# Patient Record
Sex: MALE | Race: BLACK OR AFRICAN AMERICAN | Employment: FULL TIME | URBAN - METROPOLITAN AREA
[De-identification: names, ages, dates, MRNs, and addresses within clinical notes are randomized per-mention and may not be internally consistent; named-entity substitution may affect disease eponyms.]

---

## 2020-09-21 ENCOUNTER — TELEPHONE (OUTPATIENT)
Dept: FAMILY MEDICINE CLINIC | Age: 30
End: 2020-09-21

## 2020-09-21 ENCOUNTER — VIRTUAL VISIT (OUTPATIENT)
Dept: FAMILY MEDICINE CLINIC | Age: 30
End: 2020-09-21
Payer: MEDICAID

## 2020-09-21 DIAGNOSIS — Z13.6 SCREENING FOR CARDIOVASCULAR CONDITION: ICD-10-CM

## 2020-09-21 DIAGNOSIS — F32.A DEPRESSION, UNSPECIFIED DEPRESSION TYPE: Primary | ICD-10-CM

## 2020-09-21 DIAGNOSIS — Z13.1 SCREENING FOR DIABETES MELLITUS: ICD-10-CM

## 2020-09-21 DIAGNOSIS — R42 INTERMITTENT LIGHTHEADEDNESS: ICD-10-CM

## 2020-09-21 PROCEDURE — 99203 OFFICE O/P NEW LOW 30 MIN: CPT | Performed by: FAMILY MEDICINE

## 2020-09-21 NOTE — PROGRESS NOTES
Consent: Marino Greenwood, who was seen by synchronous (real-time) audio-video technology, and/or his healthcare decision maker, is aware that this patient-initiated, Telehealth encounter on 9/21/2020 is a billable service, with coverage as determined by his insurance carrier. He is aware that he may receive a bill and has provided verbal consent to proceed: Yes. Assessment & Plan:   Diagnoses and all orders for this visit:    1. Depression, unspecified depression type  -     REFERRAL TO PSYCHIATRY  -     CBC WITH AUTOMATED DIFF; Future    2. Screening for diabetes mellitus  -     HEMOGLOBIN A1C WITH EAG; Future  -     URINALYSIS W/ RFLX MICROSCOPIC; Future  -     METABOLIC PANEL, COMPREHENSIVE; Future    3. Screening for cardiovascular condition  -     LIPID PANEL; Future    4. Intermittent lightheadedness  -     HEMOGLOBIN A1C WITH EAG; Future  -     URINALYSIS W/ RFLX MICROSCOPIC; Future  -     CBC WITH AUTOMATED DIFF; Future  -     METABOLIC PANEL, COMPREHENSIVE; Future  -     THYROID CASCADE PROFILE; Future          Follow-up and Dispositions    · Return in about 2 weeks (around 10/5/2020) for follow up, [place], discuss labs. I ADVISED PATIENT TO GO TO ER IF SYMPTOMS WORSEN , CHANGE OR FAILS TO IMPROVE. I spent at least 15 minutes with this established patient, and >50% of the time was spent counseling and/or coordinating care regarding SEE BELOW  [id number]  Subjective:   Marino Greenwood is a 27 y.o. male who was seen for [address]      1. Depression, unspecified depression type  Patient reports his daughter passed away in 2018. After that he has been feeling sad. He started drinking more on weekends. 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 He has not tried any medication or counseling for this. He requests referral today. He denies suicidal or homicidal ideation. 2. Screening for diabetes mellitus  Has family history of diabetes.     3. Screening for cardiovascular condition  Check labs    4. Intermittent lightheadedness  Patient reports he had a few episodes of lightheadedness couple of weeks ago but this has resolved now. He is recently started to eat healthy and balanced diet. He requests lab work today       Prior to Admission medications    Not on File     No Known Allergies    There is no problem list on file for this patient. There are no active problems to display for this patient. No Known Allergies  No past medical history on file. No past surgical history on file. No family history on file. Social History     Tobacco Use    Smoking status: Not on file   Substance Use Topics    Alcohol use: Not on file       Review of Systems   Constitutional: Negative. HENT: Negative. Eyes: Negative. Respiratory: Negative. Cardiovascular: Negative. Gastrointestinal: Negative. Genitourinary: Negative. Musculoskeletal: Negative. Skin: Negative. Neurological: Negative. Endo/Heme/Allergies: Negative. Psychiatric/Behavioral: Positive for depression. Negative for suicidal ideas. Objective:     No flowsheet data found.      [INSTRUCTIONS:  \"[x]\" Indicates a positive item  \"[]\" Indicates a negative item  -- DELETE ALL ITEMS NOT EXAMINED]    Constitutional: [x] Appears well-developed and well-nourished [x] No apparent distress      [] Abnormal -     Mental status: [x] Alert and awake  [x] Oriented to person/place/time [x] Able to follow commands    [] Abnormal -     Eyes:   EOM    [x]  Normal    [] Abnormal -   Sclera  [x]  Normal    [] Abnormal -          Discharge [x]  None visible   [] Abnormal -     HENT: [x] Normocephalic, atraumatic  [] Abnormal -   [x] Mouth/Throat: Mucous membranes are moist    External Ears [x] Normal  [] Abnormal -    Neck: [x] No visualized mass [] Abnormal -     Pulmonary/Chest: [x] Respiratory effort normal   [x] No visualized signs of difficulty breathing or respiratory distress        [] Abnormal -      Musculoskeletal:   [x] Normal gait with no signs of ataxia         [x] Normal range of motion of neck        [] Abnormal -     Neurological:        [x] No Facial Asymmetry (Cranial nerve 7 motor function) (limited exam due to video visit)          [x] No gaze palsy        [] Abnormal -          Skin:        [x] No significant exanthematous lesions or discoloration noted on facial skin         [] Abnormal -            Psychiatric:       [x] Normal Affect [] Abnormal -        [x] No Hallucinations    Other pertinent observable physical exam findings:-    We discussed the expected course, resolution and complications of the diagnosis(es) in detail. Medication risks, benefits, costs, interactions, and alternatives were discussed as indicated. I advised him to contact the office if his condition worsens, changes or fails to improve as anticipated. He expressed understanding with the diagnosis(es) and plan. Jina Phalen is a 27 y.o. male being evaluated by a video visit encounter for concerns as above. A caregiver was present when appropriate. Due to this being a TeleHealth encounter ([facility] public health [facility]), evaluation of the following organ systems was limited: Vitals/Constitutional/EENT/Resp/CV/GI//MS/Neuro/Skin/Heme-Lymph-Imm. Pursuant to the emergency declaration under the [facility], 1135 waiver authority and the [employer] and [employer] Act, this Virtual  Visit was conducted, with patient's (and/or legal guardian's) consent, to reduce the patient's risk of exposure to COVID-19 and provide necessary medical care. Services were provided through a video synchronous discussion virtually to substitute for in-person clinic visit. Patient was located at his home. I was at office while conducting this encounter.        Freda Vicente MD

## 2020-09-25 NOTE — TELEPHONE ENCOUNTER
Called patient, verified id [id number]. Gave pt verbal message per Dr. Arik Johnson. Patient understood verbal message given. Patient states that he will call to make apt for labs.

## 2024-06-19 ENCOUNTER — HOSPITAL ENCOUNTER (EMERGENCY)
Facility: HOSPITAL | Age: 34
Discharge: HOME | End: 2024-06-19
Attending: EMERGENCY MEDICINE
Payer: MEDICAID

## 2024-06-19 VITALS
BODY MASS INDEX: 39.17 KG/M2 | SYSTOLIC BLOOD PRESSURE: 150 MMHG | DIASTOLIC BLOOD PRESSURE: 96 MMHG | OXYGEN SATURATION: 99 % | WEIGHT: 315 LBS | HEART RATE: 89 BPM | TEMPERATURE: 99.3 F | HEIGHT: 75 IN | RESPIRATION RATE: 16 BRPM

## 2024-06-19 DIAGNOSIS — S69.91XA INJURY OF RIGHT HAND, INITIAL ENCOUNTER: Primary | ICD-10-CM

## 2024-06-19 DIAGNOSIS — S61.411A LACERATION OF RIGHT HAND WITHOUT FOREIGN BODY, INITIAL ENCOUNTER: ICD-10-CM

## 2024-06-19 PROCEDURE — 6360000002 HC RX W HCPCS: Performed by: NURSE PRACTITIONER

## 2024-06-19 PROCEDURE — 90714 TD VACC NO PRESV 7 YRS+ IM: CPT | Performed by: NURSE PRACTITIONER

## 2024-06-19 PROCEDURE — 90471 IMMUNIZATION ADMIN: CPT | Performed by: NURSE PRACTITIONER

## 2024-06-19 PROCEDURE — 12002 RPR S/N/AX/GEN/TRNK2.6-7.5CM: CPT

## 2024-06-19 PROCEDURE — 6370000000 HC RX 637 (ALT 250 FOR IP): Performed by: NURSE PRACTITIONER

## 2024-06-19 PROCEDURE — 99284 EMERGENCY DEPT VISIT MOD MDM: CPT

## 2024-06-19 PROCEDURE — 2500000003 HC RX 250 WO HCPCS: Performed by: NURSE PRACTITIONER

## 2024-06-19 RX ORDER — IBUPROFEN 800 MG/1
800 TABLET ORAL
Status: COMPLETED | OUTPATIENT
Start: 2024-06-19 | End: 2024-06-19

## 2024-06-19 RX ORDER — LIDOCAINE HYDROCHLORIDE 10 MG/ML
5 INJECTION, SOLUTION EPIDURAL; INFILTRATION; INTRACAUDAL; PERINEURAL ONCE
Status: COMPLETED | OUTPATIENT
Start: 2024-06-19 | End: 2024-06-19

## 2024-06-19 RX ADMIN — CLOSTRIDIUM TETANI TOXOID ANTIGEN (FORMALDEHYDE INACTIVATED) AND CORYNEBACTERIUM DIPHTHERIAE TOXOID ANTIGEN (FORMALDEHYDE INACTIVATED) 0.5 ML: 5; 2 INJECTION, SUSPENSION INTRAMUSCULAR at 22:05

## 2024-06-19 RX ADMIN — LIDOCAINE HYDROCHLORIDE 5 ML: 10 INJECTION, SOLUTION EPIDURAL; INFILTRATION; INTRACAUDAL; PERINEURAL at 22:15

## 2024-06-19 RX ADMIN — IBUPROFEN 800 MG: 800 TABLET, FILM COATED ORAL at 22:05

## 2024-06-19 ASSESSMENT — PAIN DESCRIPTION - LOCATION: LOCATION: HAND

## 2024-06-19 ASSESSMENT — PAIN DESCRIPTION - DESCRIPTORS: DESCRIPTORS: BURNING

## 2024-06-19 ASSESSMENT — PAIN DESCRIPTION - ORIENTATION: ORIENTATION: POSTERIOR;RIGHT

## 2024-06-19 ASSESSMENT — PAIN - FUNCTIONAL ASSESSMENT: PAIN_FUNCTIONAL_ASSESSMENT: 0-10

## 2024-06-19 ASSESSMENT — PAIN SCALES - GENERAL: PAINLEVEL_OUTOF10: 3

## 2024-06-20 NOTE — ED TRIAGE NOTES
Pt arrives to ED via POV ambulatory c/o lac to back of R hand from aluminum can of green beans. Pt reports he is not up to date on tetanus vaccine. Provider in triage.

## 2024-06-20 NOTE — DISCHARGE INSTRUCTIONS
Keep the wound bandaged and dry for the first day.  Check with your doctor about how long you need to keep your wound dry. In some cases the bandage can be removed after 24 to 48 hours, and the wound can then be gently washed to remove the crust. Do not scrub or soak the wound during the first 48 hours.  After the first day, wash around the wound with clean water 2 times a day.  Don't use hydrogen peroxide or alcohol, which can slow healing.  Cover the wound with petroleum jelly and a non-stick bandage if needed.  Use a thin layer of petroleum jelly, such as Vaseline. Apply more petroleum jelly and replace the bandage as needed.  Your cut may not need a bandage if it's not likely to get dirty, it's not draining, and it's in an area where clothing will not rub it. If you use a bandage, change it every 24 hours and anytime it gets wet or very dirty.  Check your wound every day for signs of infection.  It's normal for stitches or staples to cause a small amount of skin redness and swelling where the stitch or staple enters the skin. Your wound may itch or feel irritated.    Sutures can be removed in 7 to 10 days      Alternate Tylenol with ibuprofen for pain management

## 2024-06-20 NOTE — ED PROVIDER NOTES
[facility] EMERGENCY DEPT  EMERGENCY DEPARTMENT ENCOUNTER      Pt Name: Vincent Carmen  MRN: [id number]  Birthdate 1990  Date of evaluation: 6/19/2024  Provider: SAMINA Martinez NP    CHIEF COMPLAINT       Chief Complaint   Patient presents with    Hand Injury         HISTORY OF PRESENT ILLNESS   (Location/Symptom, Timing/Onset, Context/Setting, Quality, Duration, Modifying Factors, Severity)  Note limiting factors.   The history is provided by the patient. No  was used.       Vincent Carmen is a 33 y.o. male with Hx of depression who presents ambulatory by himself to [facility] ED with cc of hand injury.     Patient opened a can of green beans tonight in the middle side of the can accidentally cut the top of his right hand.  No anticoagulation use, concerned with range of motion.  Tetanus is not up-to-date.  Patient states that he is otherwise healthy. R hand dominant.  No LTAC.     Reports marijuana use.  Denies alcohol, tobacco, or other substance abuse.        PCP: No primary care provider on file.    There are no other complaints, changes or physical findings at this time.        Review of External Medical Records:     Nursing Notes were reviewed.    REVIEW OF SYSTEMS    (2-9 systems for level 4, 10 or more for level 5)     Review of Systems   Musculoskeletal:  Positive for arthralgias.   Skin:  Positive for wound.       Except as noted above the remainder of the review of systems was reviewed and negative.       PAST MEDICAL HISTORY   No past medical history on file.      SURGICAL HISTORY     No past surgical history on file.      CURRENT MEDICATIONS       There are no discharge medications for this patient.      ALLERGIES     Patient has no known allergies.    FAMILY HISTORY     No family history on file.       SOCIAL HISTORY       Social History     Socioeconomic History    Marital status:            PHYSICAL EXAM    (up to 7 for level 4, 8 or more for level 5)     ED Triage

## 2024-06-26 ENCOUNTER — HOSPITAL ENCOUNTER (EMERGENCY)
Facility: HOSPITAL | Age: 34
Discharge: HOME | End: 2024-06-26
Attending: EMERGENCY MEDICINE
Payer: MEDICAID

## 2024-06-26 VITALS
DIASTOLIC BLOOD PRESSURE: 81 MMHG | TEMPERATURE: 98.5 F | BODY MASS INDEX: 39.17 KG/M2 | WEIGHT: 315 LBS | HEIGHT: 75 IN | RESPIRATION RATE: 18 BRPM | SYSTOLIC BLOOD PRESSURE: 133 MMHG | OXYGEN SATURATION: 99 % | HEART RATE: 76 BPM

## 2024-06-26 DIAGNOSIS — T81.30XA WOUND DEHISCENCE: ICD-10-CM

## 2024-06-26 DIAGNOSIS — Z48.02 VISIT FOR SUTURE REMOVAL: Primary | ICD-10-CM

## 2024-06-26 DIAGNOSIS — L03.011 CELLULITIS OF FINGER OF RIGHT HAND: ICD-10-CM

## 2024-06-26 PROCEDURE — 4500000002 HC ER NO CHARGE

## 2024-06-26 PROCEDURE — 99283 EMERGENCY DEPT VISIT LOW MDM: CPT

## 2024-06-26 RX ORDER — CEPHALEXIN 500 MG/1
500 CAPSULE ORAL 4 TIMES DAILY
Qty: 28 CAPSULE | Refills: 0 | Status: SHIPPED | OUTPATIENT
Start: 2024-06-26 | End: 2024-07-03

## 2024-06-26 ASSESSMENT — LIFESTYLE VARIABLES
HOW OFTEN DO YOU HAVE A DRINK CONTAINING ALCOHOL: 2-4 TIMES A MONTH
HOW MANY STANDARD DRINKS CONTAINING ALCOHOL DO YOU HAVE ON A TYPICAL DAY: 3 OR 4

## 2024-06-26 ASSESSMENT — PAIN DESCRIPTION - LOCATION: LOCATION: HAND

## 2024-06-26 ASSESSMENT — PAIN SCALES - GENERAL: PAINLEVEL_OUTOF10: 7

## 2024-06-26 ASSESSMENT — PAIN - FUNCTIONAL ASSESSMENT: PAIN_FUNCTIONAL_ASSESSMENT: 0-10

## 2024-06-26 NOTE — ED PROVIDER NOTES
regular rhythm.      Pulses: Normal pulses.      Heart sounds: Normal heart sounds.   Pulmonary:      Effort: Pulmonary effort is normal. No respiratory distress.      Breath sounds: Normal breath sounds.   Musculoskeletal:         General: Normal range of motion.   Skin:     General: Skin is warm.      Comments: Right hand: 2cm straight incision to dorsal aspect of hand near 2nd MCP. Distal aspect of wound with small area of dehiscence with clear drainage noted. No erythema or warmth.   Neurological:      General: No focal deficit present.      Mental Status: He is alert.   Psychiatric:         Behavior: Behavior normal.         DIAGNOSTIC RESULTS     EKG: All EKG's are interpreted by the Emergency Department Physician who either signs or Co-signs this chart in the absence of a cardiologist.        RADIOLOGY:   Non-plain film images such as CT, Ultrasound and MRI are read by the radiologist. Plain radiographic images are visualized and preliminarily interpreted by the emergency physician with the below findings:        Interpretation per the Radiologist below, if available at the time of this note:    No orders to display        LABS:  Labs Reviewed - No data to display    All other labs were within normal range or not returned as of this dictation.    EMERGENCY DEPARTMENT COURSE and DIFFERENTIAL DIAGNOSIS/MDM:   Vitals:    Vitals:    06/26/24 1636   BP: 133/81   Pulse: 76   Resp: 18   Temp: 98.5 °F (36.9 °C)   TempSrc: Oral   SpO2: 99%   Weight: (!) 167.8 kg (370 lb)   Height: 1.905 m (6' 3\")           Medical Decision Making  Patient is a 33-year-old male who presented for suture removal.  His wound had dehisced and there is a small opening noted.  1 suture is removed.  Discussed concern for developing cellulitis and advised taking Keflex as prescribed.  He will follow-up with his PCP or return to ER if he develops new or worsening symptoms.    Risk  Prescription drug management.            REASSESSMENT

## 2024-06-26 NOTE — DISCHARGE INSTRUCTIONS
Keep the wound clean and dry.  Wear finger splint to help from bending your finger.  If you develop worsening redness, swelling, drainage or increase in pain take antibiotic as prescribed.  Recommend alternating Tylenol 1000 mg and ibuprofen 500 mg every 4 hours as needed for pain.

## 2024-06-26 NOTE — ED TRIAGE NOTES
Pt. States need stitches removed from right hand, states some came out already and seems that wound is still open.

## 2024-06-26 NOTE — ED NOTES
DC paperwork reviewed, Pt verbalized understanding. Pt ambulatory at time of DC. No distress noted.